# Patient Record
Sex: FEMALE | Race: WHITE | NOT HISPANIC OR LATINO | URBAN - NONMETROPOLITAN AREA
[De-identification: names, ages, dates, MRNs, and addresses within clinical notes are randomized per-mention and may not be internally consistent; named-entity substitution may affect disease eponyms.]

---

## 2019-08-13 ENCOUNTER — OFFICE (OUTPATIENT)
Dept: URBAN - NONMETROPOLITAN AREA CLINIC 13 | Facility: CLINIC | Age: 84
End: 2019-08-13
Payer: MEDICARE

## 2019-08-13 ENCOUNTER — OFFICE (OUTPATIENT)
Dept: URBAN - NONMETROPOLITAN AREA CLINIC 13 | Facility: CLINIC | Age: 84
End: 2019-08-13
Payer: MEDICAID

## 2019-08-13 VITALS
HEART RATE: 75 BPM | DIASTOLIC BLOOD PRESSURE: 78 MMHG | RESPIRATION RATE: 18 BRPM | OXYGEN SATURATION: 95 % | SYSTOLIC BLOOD PRESSURE: 131 MMHG | WEIGHT: 155 LBS | HEIGHT: 67 IN

## 2019-08-13 VITALS — HEIGHT: 67 IN

## 2019-08-13 DIAGNOSIS — Z79.899 OTHER LONG TERM (CURRENT) DRUG THERAPY: ICD-10-CM

## 2019-08-13 DIAGNOSIS — R10.12 LEFT UPPER QUADRANT PAIN: ICD-10-CM

## 2019-08-13 DIAGNOSIS — R11.2 NAUSEA WITH VOMITING, UNSPECIFIED: ICD-10-CM

## 2019-08-13 DIAGNOSIS — R13.10 DYSPHAGIA, UNSPECIFIED: ICD-10-CM

## 2019-08-13 DIAGNOSIS — R93.5 ABNORMAL FINDINGS ON DIAGNOSTIC IMAGING OF OTHER ABDOMINAL R: ICD-10-CM

## 2019-08-13 DIAGNOSIS — K21.9 GASTRO-ESOPHAGEAL REFLUX DISEASE WITHOUT ESOPHAGITIS: ICD-10-CM

## 2019-08-13 DIAGNOSIS — K52.9 NONINFECTIVE GASTROENTERITIS AND COLITIS, UNSPECIFIED: ICD-10-CM

## 2019-08-13 DIAGNOSIS — Z12.11 ENCOUNTER FOR SCREENING FOR MALIGNANT NEOPLASM OF COLON: ICD-10-CM

## 2019-08-13 LAB
CBC: HCT: 38.8 % (ref 34–46.6)
CBC: HGB: 13.1 G/DL (ref 11.1–15.9)
CBC: LYMPH. ABSOLUTE: 1.9 10 (ref 1.3–2.9)
CBC: LYMPHOCYTES: 26.2 % (ref 20.5–40.5)
CBC: MCH: 33.9 PG — HIGH (ref 26.6–33)
CBC: MCHC: 33.8 G/DL (ref 31.5–35.7)
CBC: MCV: 100.3 FL — HIGH (ref 79–97)
CBC: MONO. ABSOLUTE: 0.9 10 (ref 0.3–3.8)
CBC: MONOCYTES: 12.4 % — HIGH (ref 0.3–3.8)
CBC: NEUT. ABSOLUTE: 4.4 10 (ref 2.2–4.8)
CBC: NEUTROPHILS: 61.4 % (ref 43–67)
CBC: PLATELET: 330 10 (ref 130–440)
CBC: RBC: 3.87 10 (ref 3.77–5.28)
CBC: RDW: 12.9 % (ref 11.5–15.4)
CBC: WBC: 7.2 10 (ref 3.4–10.8)
CMP: ALBUMIN: 3.7 G/DL (ref 3.5–5.5)
CMP: ALK.PHOS.: 59 U/L (ref 33–130)
CMP: ALT: 12 U/L (ref 9–55)
CMP: AST: 19 U/L (ref 9–40)
CMP: BUN: 23 MG/DL (ref 7–26)
CMP: CALCIUM: 9.3 MG/DL (ref 8.4–10.3)
CMP: CHLORIDE: 104 MMOL/L (ref 96–106)
CMP: CO2: 30 MEQ/L — HIGH (ref 20–29)
CMP: CREATININE: 1.3 MG/DL — HIGH (ref 0.57–1.25)
CMP: GFR AFR. AMER.: 50.1 ML/MIN/1.73 — LOW (ref 60–?)
CMP: GFR NON AFR. AMER.: 41.4 ML/MIN/1.73 — LOW (ref 60–?)
CMP: GLUCOSE: 92 MG/DL (ref 65–99)
CMP: POTASSIUM: 5 MMOL/L (ref 3.5–5.3)
CMP: SODIUM: 137 MMOL/L (ref 134–144)
CMP: TOTAL BILIRUBIN: 0.4 MG/DL (ref 0.3–1.2)
CMP: TOTAL PROTEIN: 5.8 G/DL — LOW (ref 6–8.5)

## 2019-08-13 PROCEDURE — 85025 COMPLETE CBC W/AUTO DIFF WBC: CPT

## 2019-08-13 PROCEDURE — 80053 COMPREHEN METABOLIC PANEL: CPT

## 2019-08-13 PROCEDURE — 99203 OFFICE O/P NEW LOW 30 MIN: CPT

## 2019-08-13 RX ORDER — ONDANSETRON HYDROCHLORIDE 4 MG/1
TABLET, FILM COATED ORAL
Qty: 2 | Refills: 0 | Status: COMPLETED
Start: 2019-08-13 | End: 2019-08-30

## 2019-08-13 RX ORDER — POLYETHYLENE GLYCOL 3350, SODIUM SULFATE ANHYDROUS, SODIUM BICARBONATE, SODIUM CHLORIDE, POTASSIUM CHLORIDE 236; 22.74; 6.74; 5.86; 2.97 G/4L; G/4L; G/4L; G/4L; G/4L
POWDER, FOR SOLUTION ORAL
Qty: 1 | Refills: 0 | Status: COMPLETED
Start: 2019-08-13 | End: 2019-08-30

## 2019-08-13 RX ORDER — BISACODYL 5 MG
TABLET, DELAYED RELEASE (ENTERIC COATED) ORAL
Qty: 8 | Refills: 0 | Status: COMPLETED
Start: 2019-08-13 | End: 2019-08-30

## 2019-08-20 ENCOUNTER — AMBULATORY SURGICAL CENTER (OUTPATIENT)
Dept: URBAN - NONMETROPOLITAN AREA SURGERY 2 | Facility: SURGERY | Age: 84
End: 2019-08-20
Payer: MEDICARE

## 2019-08-20 ENCOUNTER — OFFICE (OUTPATIENT)
Dept: URBAN - NONMETROPOLITAN AREA CLINIC 13 | Facility: CLINIC | Age: 84
End: 2019-08-20
Payer: MEDICARE

## 2019-08-20 ENCOUNTER — AMBULATORY SURGICAL CENTER (OUTPATIENT)
Dept: URBAN - NONMETROPOLITAN AREA SURGERY 2 | Facility: SURGERY | Age: 84
End: 2019-08-20

## 2019-08-20 VITALS
SYSTOLIC BLOOD PRESSURE: 128 MMHG | WEIGHT: 155 LBS | HEIGHT: 67 IN | RESPIRATION RATE: 17 BRPM | DIASTOLIC BLOOD PRESSURE: 63 MMHG | SYSTOLIC BLOOD PRESSURE: 151 MMHG | DIASTOLIC BLOOD PRESSURE: 72 MMHG | HEART RATE: 65 BPM | DIASTOLIC BLOOD PRESSURE: 84 MMHG | HEART RATE: 67 BPM | DIASTOLIC BLOOD PRESSURE: 74 MMHG | DIASTOLIC BLOOD PRESSURE: 81 MMHG | OXYGEN SATURATION: 96 % | SYSTOLIC BLOOD PRESSURE: 126 MMHG | HEART RATE: 61 BPM | OXYGEN SATURATION: 92 % | OXYGEN SATURATION: 99 % | DIASTOLIC BLOOD PRESSURE: 68 MMHG | HEART RATE: 63 BPM | SYSTOLIC BLOOD PRESSURE: 114 MMHG | SYSTOLIC BLOOD PRESSURE: 150 MMHG | RESPIRATION RATE: 18 BRPM | SYSTOLIC BLOOD PRESSURE: 134 MMHG | OXYGEN SATURATION: 95 % | OXYGEN SATURATION: 93 % | HEART RATE: 87 BPM | OXYGEN SATURATION: 91 % | SYSTOLIC BLOOD PRESSURE: 123 MMHG

## 2019-08-20 DIAGNOSIS — R93.89 ABNORMAL FINDINGS ON DIAGNOSTIC IMAGING OF OTHER SPECIFIED B: ICD-10-CM

## 2019-08-20 DIAGNOSIS — K29.70 GASTRITIS, UNSPECIFIED, WITHOUT BLEEDING: ICD-10-CM

## 2019-08-20 DIAGNOSIS — K21.9 GASTRO-ESOPHAGEAL REFLUX DISEASE WITHOUT ESOPHAGITIS: ICD-10-CM

## 2019-08-20 DIAGNOSIS — R13.10 DYSPHAGIA, UNSPECIFIED: ICD-10-CM

## 2019-08-20 DIAGNOSIS — R11.2 NAUSEA WITH VOMITING, UNSPECIFIED: ICD-10-CM

## 2019-08-20 PROBLEM — R63.4 WEIGHT LOSS: Status: ACTIVE | Noted: 2019-08-20

## 2019-08-20 PROBLEM — R93.8 COMPUTED TOMOGRAPHY RESULT ABNORMAL (GI TRACT): Status: ACTIVE | Noted: 2019-08-20

## 2019-08-20 PROCEDURE — 88305 TISSUE EXAM BY PATHOLOGIST: CPT | Mod: TC | Performed by: INTERNAL MEDICINE

## 2019-08-20 PROCEDURE — 43239 EGD BIOPSY SINGLE/MULTIPLE: CPT | Mod: 59 | Performed by: INTERNAL MEDICINE

## 2019-08-20 PROCEDURE — 43248 EGD GUIDE WIRE INSERTION: CPT | Performed by: INTERNAL MEDICINE

## 2019-08-20 PROCEDURE — 00731 ANES UPR GI NDSC PX NOS: CPT | Mod: QS,QZ

## 2019-08-20 RX ORDER — DOCUSATE SODIUM AND SENNOSIDES 50; 8.6 MG/1; MG/1
TABLET, FILM COATED ORAL
Qty: 60 | Refills: 5 | Status: ACTIVE
Start: 2019-08-20

## 2019-08-20 RX ORDER — DEXLANSOPRAZOLE 60 MG/1
CAPSULE, DELAYED RELEASE ORAL
Qty: 90 | Refills: 1 | Status: ACTIVE

## 2019-08-20 RX ORDER — RANITIDINE 300 MG/1
TABLET ORAL
Qty: 180 | Refills: 1 | Status: ACTIVE

## 2019-08-22 LAB — SURGICAL PROCEDURE: PDFREPORT1: (no result)

## 2019-08-30 ENCOUNTER — AMBULATORY SURGICAL CENTER (OUTPATIENT)
Dept: URBAN - NONMETROPOLITAN AREA SURGERY 2 | Facility: SURGERY | Age: 84
End: 2019-08-30
Payer: MEDICARE

## 2019-08-30 VITALS
HEIGHT: 67 IN | OXYGEN SATURATION: 92 % | SYSTOLIC BLOOD PRESSURE: 156 MMHG | OXYGEN SATURATION: 95 % | DIASTOLIC BLOOD PRESSURE: 86 MMHG | OXYGEN SATURATION: 93 % | SYSTOLIC BLOOD PRESSURE: 143 MMHG | HEART RATE: 74 BPM | HEART RATE: 68 BPM | HEART RATE: 69 BPM | HEART RATE: 77 BPM | RESPIRATION RATE: 18 BRPM | OXYGEN SATURATION: 97 % | DIASTOLIC BLOOD PRESSURE: 82 MMHG | OXYGEN SATURATION: 94 % | DIASTOLIC BLOOD PRESSURE: 65 MMHG | OXYGEN SATURATION: 96 % | OXYGEN SATURATION: 98 % | SYSTOLIC BLOOD PRESSURE: 126 MMHG | SYSTOLIC BLOOD PRESSURE: 144 MMHG | SYSTOLIC BLOOD PRESSURE: 150 MMHG | HEART RATE: 63 BPM | HEART RATE: 72 BPM | DIASTOLIC BLOOD PRESSURE: 77 MMHG | DIASTOLIC BLOOD PRESSURE: 78 MMHG | OXYGEN SATURATION: 91 % | DIASTOLIC BLOOD PRESSURE: 60 MMHG | HEART RATE: 64 BPM | SYSTOLIC BLOOD PRESSURE: 149 MMHG | DIASTOLIC BLOOD PRESSURE: 80 MMHG | RESPIRATION RATE: 20 BRPM | SYSTOLIC BLOOD PRESSURE: 135 MMHG | WEIGHT: 155 LBS

## 2019-08-30 DIAGNOSIS — Z12.11 ENCOUNTER FOR SCREENING FOR MALIGNANT NEOPLASM OF COLON: ICD-10-CM

## 2019-08-30 PROBLEM — R93.8: Status: ACTIVE | Noted: 2019-08-30

## 2019-08-30 PROBLEM — K57.30 DVRTCLOS OF LG INT W/O PERFORATION OR ABSCESS W/O BLEEDING: Status: ACTIVE | Noted: 2019-08-30

## 2019-08-30 PROCEDURE — G0121 COLON CA SCRN NOT HI RSK IND: HCPCS | Performed by: INTERNAL MEDICINE

## 2024-10-04 ENCOUNTER — OFFICE (OUTPATIENT)
Dept: URBAN - NONMETROPOLITAN AREA CLINIC 1 | Facility: CLINIC | Age: 88
End: 2024-10-04
Payer: COMMERCIAL

## 2024-10-04 VITALS
SYSTOLIC BLOOD PRESSURE: 125 MMHG | DIASTOLIC BLOOD PRESSURE: 72 MMHG | HEART RATE: 77 BPM | WEIGHT: 112 LBS | HEIGHT: 65 IN

## 2024-10-04 DIAGNOSIS — R10.84 GENERALIZED ABDOMINAL PAIN: ICD-10-CM

## 2024-10-04 DIAGNOSIS — R63.4 ABNORMAL WEIGHT LOSS: ICD-10-CM

## 2024-10-04 PROCEDURE — 99204 OFFICE O/P NEW MOD 45 MIN: CPT | Performed by: INTERNAL MEDICINE

## 2024-10-04 PROCEDURE — 36415 COLL VENOUS BLD VENIPUNCTURE: CPT | Performed by: INTERNAL MEDICINE

## 2024-10-04 NOTE — SERVICEHPINOTES
patient is a 89-year-old female who frequently vomits after she eats.  She feels like food gets lodged on the left side of her esophagus.  She walks with a cane in lives at home.  She has lost a few pounds.  She cannot quantify how much.  She is eating primarily soft foods such as yogurt and cottage cheese.  She does occasionally have constipation and takes magnesium citrate or Dulcolax at least once a week.    She has said that these are unremarkable.  In March of this year she was hospitalized in [facility] for pneumonia and had a pacemaker placement.

## 2024-10-04 NOTE — SERVICENOTES
Rail elderly female Will obtain routine biochemistries and CT scan of the abdomen and pelvis.  Will empirically try her on Linzess 145 microgram samples to see this may improve some of her symptoms.